# Patient Record
Sex: MALE | Race: WHITE
[De-identification: names, ages, dates, MRNs, and addresses within clinical notes are randomized per-mention and may not be internally consistent; named-entity substitution may affect disease eponyms.]

---

## 2017-08-25 ENCOUNTER — HOSPITAL ENCOUNTER (EMERGENCY)
Dept: HOSPITAL 83 - ED | Age: 32
Discharge: HOME | End: 2017-08-25
Payer: SELF-PAY

## 2017-08-25 VITALS — HEIGHT: 68.98 IN | BODY MASS INDEX: 30.36 KG/M2 | WEIGHT: 205 LBS

## 2017-08-25 DIAGNOSIS — T63.441A: Primary | ICD-10-CM
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
## 2017-08-25 DIAGNOSIS — M79.89: ICD-10-CM

## 2017-08-25 DIAGNOSIS — F17.200: ICD-10-CM

## 2018-09-11 ENCOUNTER — HOSPITAL ENCOUNTER (EMERGENCY)
Dept: HOSPITAL 83 - ED | Age: 33
Discharge: HOME | End: 2018-09-11
Payer: SELF-PAY

## 2018-09-11 VITALS — HEIGHT: 70 IN | BODY MASS INDEX: 28.63 KG/M2 | WEIGHT: 200 LBS

## 2018-09-11 DIAGNOSIS — L73.2: Primary | ICD-10-CM

## 2018-09-11 DIAGNOSIS — F17.200: ICD-10-CM

## 2018-09-11 DIAGNOSIS — Z79.899: ICD-10-CM

## 2019-07-05 ENCOUNTER — HOSPITAL ENCOUNTER (INPATIENT)
Dept: HOSPITAL 83 - ED | Age: 34
LOS: 5 days | Discharge: HOME | DRG: 854 | End: 2019-07-10
Attending: INTERNAL MEDICINE | Admitting: INTERNAL MEDICINE
Payer: SELF-PAY

## 2019-07-05 VITALS
BODY MASS INDEX: 28.96 KG/M2 | HEIGHT: 70 IN | DIASTOLIC BLOOD PRESSURE: 78 MMHG | WEIGHT: 202.31 LBS | SYSTOLIC BLOOD PRESSURE: 120 MMHG

## 2019-07-05 VITALS — SYSTOLIC BLOOD PRESSURE: 120 MMHG | DIASTOLIC BLOOD PRESSURE: 59 MMHG

## 2019-07-05 DIAGNOSIS — Z83.3: ICD-10-CM

## 2019-07-05 DIAGNOSIS — E78.5: ICD-10-CM

## 2019-07-05 DIAGNOSIS — F17.210: ICD-10-CM

## 2019-07-05 DIAGNOSIS — Z79.899: ICD-10-CM

## 2019-07-05 DIAGNOSIS — Z80.9: ICD-10-CM

## 2019-07-05 DIAGNOSIS — R73.9: ICD-10-CM

## 2019-07-05 DIAGNOSIS — K21.9: ICD-10-CM

## 2019-07-05 DIAGNOSIS — L03.116: ICD-10-CM

## 2019-07-05 DIAGNOSIS — Z71.6: ICD-10-CM

## 2019-07-05 DIAGNOSIS — L02.612: ICD-10-CM

## 2019-07-05 DIAGNOSIS — A41.9: Primary | ICD-10-CM

## 2019-07-05 DIAGNOSIS — E66.3: ICD-10-CM

## 2019-07-05 LAB
BASOPHILS # BLD AUTO: 0.1 10*3/UL (ref 0–0.1)
BASOPHILS NFR BLD AUTO: 0.4 % (ref 0–1)
BUN SERPL-MCNC: 17 MG/DL (ref 7–24)
CHLORIDE SERPL-SCNC: 107 MMOL/L (ref 98–107)
CREAT SERPL-MCNC: 1.1 MG/DL (ref 0.7–1.3)
EOSINOPHIL # BLD AUTO: 0.3 10*3/UL (ref 0–0.4)
EOSINOPHIL # BLD AUTO: 1.8 % (ref 1–4)
ERYTHROCYTE [DISTWIDTH] IN BLOOD BY AUTOMATED COUNT: 12.6 % (ref 0–14.5)
HCT VFR BLD AUTO: 46 % (ref 42–52)
HGB BLD-MCNC: 16 G/DL (ref 14–18)
LYMPHOCYTES # BLD AUTO: 3.5 10*3/UL (ref 1.3–4.4)
LYMPHOCYTES NFR BLD AUTO: 23.5 % (ref 27–41)
MCH RBC QN AUTO: 31.9 PG (ref 27–31)
MCHC RBC AUTO-ENTMCNC: 34.8 G/DL (ref 33–37)
MCV RBC AUTO: 91.6 FL (ref 80–94)
MONOCYTES # BLD AUTO: 1 10*3/UL (ref 0.1–1)
MONOCYTES NFR BLD MANUAL: 7 % (ref 3–9)
NEUT #: 9.8 10*3/UL (ref 2.3–7.9)
NEUT %: 67 % (ref 47–73)
NRBC BLD QL AUTO: 0 % (ref 0–0)
PLATELET # BLD AUTO: 355 10*3/UL (ref 130–400)
PMV BLD AUTO: 10 FL (ref 9.6–12.3)
POTASSIUM SERPL-SCNC: 4 MMOL/L (ref 3.5–5.1)
RBC # BLD AUTO: 5.02 10*6/UL (ref 4.5–5.9)
SODIUM SERPL-SCNC: 141 MMOL/L (ref 136–145)
WBC NRBC COR # BLD AUTO: 14.7 10*3/UL (ref 4.8–10.8)

## 2019-07-05 NOTE — PR
[place], [place]
 
                                      PROGRESS NOTE
 
        NAME: ALICIA AMAYA                 [facility] #: [id number]  
        UNIT #: [id number]                       ROOM: 507       
        DOCTOR: ADAM FERNANDES DPM               BIRTHDATE: [birth year]
 
 
DOS: 07/08/2019
 
SUBJECTIVE:  This patient is seen for followup of abscess, dorsal left forefoot.
 The patient denies any trauma or injury.  He denies being diabetic.  The
patient denies fever or chills.  Denies nausea, vomiting or night sweats.  He
states he is eating well and admits to some mild pain in the foot.
 
OBJECTIVE:   Pedal pulses are palpable.  Hair growth is present.  Skin
temperature is warm.  CFT is less than 2 seconds to all digits.  Sensation is
grossly intact and symmetrical.  Dorsal left forefoot just proximal to the
fourth webspace has some localized edema and erythema with some tenderness noted
to palpation.  No expressible drainage.  Still some mild increase in temperature
noted.  Erythema is about 2-3 cm surrounding the central area at the proximal
webspace, left foot.
 
LABORATORY DATA:  White blood cell count is up to 11.6, patient is afebrile.
 
ASSESSMENT:  Cutaneous abscess, left foot, infection, left foot pain.
 
PLAN:  Evaluation and management.  The patient just had his MRI recently and is
not read yet.  I discussed with him treatment will depend on what the MRI shows.
 If MRI is positive for abscess, he may need surgical incision and drainage of
the area.  If MRI is negative for abscess, then possibly discharge home on
antibiotics, told him his treatment.  Next step in treatment will depend on the
results of the MRI. We will see what the MRI shows and follow up from there. 
The patient is given opportunity to ask questions and all questions were
answered.
 
 
 
_________________________________
ADAM FERNANDES DPM
 
CM:PNTRANS
 
D: 07/08/19 1220                 
T: 07/08/19 2328
             
                                                            
ADAM FERNANDES DPM               
                                                            
07/08/19
2327
                              
interface

## 2019-07-05 NOTE — CON
[place], [place]
 
                                 REPORT OF CONSULTATION
 
        NAME: ALICIA AMAYA                   formerly [facility] #: [id number]  
        UNIT #: [id number]                         ROOM: 507       
        DOCTOR: CHRISTEL RIVAS DPM              BIRTHDATE: [birth year]
 
 
DOS: 07/06/2019
 
SUBJECTIVE:  The patient presents a 34-year-old male with chief complaint of
swelling and infection of the left foot, the patient has had for the past week. 
The patient has been seen at the ED at [facility], was sent home with p.o.
antibiotics.  The patient was seen yesterday at the Emergency Room and was
admitted.
 
PAST MEDICAL HISTORY:  Bee-sting, hidradenitis, infected dental caries,
pulpitis.
 
PAST SURGICAL HISTORY:  None.
 
SOCIAL HISTORY:  Cigarette nicotine dependence.  Denies illicit drug use or
alcohol.
 
ALLERGIES:  None.
 
LOWER EXTREMITY EXAMINATION:  Pedal pulses palpable.  Epicritic sensations
intact.  There is erythema with mild edema to the dorsal distal fourth left
interspace MPJ.  There is a small fluctuant abscess noted overlying the dorsal
fourth left interspace incision and drainage with a 15 blade revealed
approximately 1cc mL of purulent drainage.  There is no apparent deep sinus
tract.  Erythema apparently has decreased since previous exam, was noted to
encompass the dorsal distal lateral left foot.  The results of the CT scan of
the left foot revealed soft tissue swelling dorsal lateral aspect.  No osseous
findings.  No signs of gas within the tissue.  The patient's WBC has decreased
to 11.0 today compared to 14.7 yesterday.  The patient's lactic acid baseline
was 1.1 yesterday.
 
ASSESSMENT:  Abscess, cellulitis, pain with edema dorsolateral, left foot.
 
PLAN:  Evaluation and management.  Sterile prep with Betadine was performed, a 1
cm incision was made over the dorsal fourth left MPJ interspace area,
approximately 1cc mL of purulent drainage was noted and was cultured.  No
apparent deep sinus tract was noticed at this time.  The area was cleaned with
saline and betadine gauze, Kerlix dressing was applied.  Discussed with the
patient.  His WBC already decreased to 11 that I believe the abscess was
resolved with the bedside procedure, but if the erythema and edema along with
pain does not continue to decrease tomorrow that we may need to take him for
further exploration in the operating room on Monday if warranted.  I do not feel
this will be necessary, but if deemed no further improvement by tomorrow, we may
take the patient to the OR for a larger incision under anesthesia and deeper
exploration of the soft tissue.  Recommend Infectious Disease consultation and
we will discuss the case with Dr. Garcia and Dr. Jade.  Dr. Garcia
will see the patient tomorrow.
 
 
 
 
 
                              [place], [place]
 
                                 REPORT OF CONSULTATION
 
        NAME: ALICIA AMAYA HUA                   ACCT #: [id number]  
        UNIT #: [id number]                         ROOM: 50       
        DOCTOR: CHRISTEL RIVAS DPM              BIRTHDATE: [birth year]
 
 
_________________________________
CHRISTEL RIVAS DPM
 
CM:CONSTR:REPORT OF CONSULTATION
 
D: 07/06/19 0826   T: 07/06/19                           
07/10/19     0726                                          interface

## 2019-07-05 NOTE — O
[place], [place]
 
                                      OPERATIVE NOTE
 
        NAME: ALICIA AMYAA                  [facility] #: [id number]  
        UNIT #: [id number]                        ROOM: 507       
        DOCTOR: CHRISTEL RIVAS DPM             BIRTHDATE: [birth year]
 
 
DOS: 07/09/2019
 
PREOPERATIVE DIAGNOSIS:  Abscess, left foot.
 
POSTOPERATIVE DIAGNOSIS:  Abscess, left foot.
 
SURGERY:  Incision and drainage of deep abscess, left foot.
 
BLOOD LOSS:  4 mL.
 
PACKING:  Quarter inch plain packing.
 
SURGEON:  Christel Rivas DPM.
 
ASSISTANT:  AUNDREA Garcia DPM.
 
ANESTHESIA:  General.
 
COMPLICATIONS:  None.
 
PROCEDURE DETAILS:  The patient was seen in preop area and the left foot was
reevaluated.  The abscess had progressed to worsening situation since his
previous evaluation on Saturday.  There is still edema and erythema noted with
pain overlying the fourth left interspace extending into the interspace between
the fourth and fifth left toes.  Discussed preoperatively with the patient and
his family the surgery in details, postoperative recovery time, and potential
risks and complications.  The patient agreed and we proceeded with surgical
intervention consisting of incision and drainage of the left foot.  The patient
brought to the operating room, placed on the operating table in supine position.
 Anesthesia administered per anesthesia department.  Next, a 15 blade was
utilized to incise the abscess overlying the dorsal fourth intermetatarsal space
overlying the MPJ area.  Purulent drainage was noted to emanate from the wound,
which was much worse than his previous procedure performed Saturday.  A [place]
elevator was utilized and the abscess extended to the dorsal fourth left MPJ
extending into the fourth interspace down to the plantar foot.  The abscess was
drained at this time.  There was approximately 6 mL of purulent drainage noted,
which was also cultured.  The area was inspected for further evidence of
tracking and none was found.  The area was copiously flushed with saline and the
proximal incision was reapproximated in a horizontal mattress stitch manner with
3-0 Vicryl.  Next, the remaining site was packed with quarter-inch plain packing
and sterile compressive dressing consisting of Xeroform, 4 x 4s, Kerlix and Ace
bandage was applied.  The patient resumed previous orders, returned to the
nursing unit and continued antibiotics per infectious disease.  The patient
tolerated the procedure and anesthesia well and left the OR with vital signs
stable and neurovascular status intact.
 
 
 
 
                              [place], [place]
 
                                      OPERATIVE NOTE
 
        NAME: ALICIA AMAYA                  ACCT #: [id number]  
        UNIT #: [id number]                        ROOM: 507       
        DOCTOR: CHRISTEL RIVAS DPM             BIRTHDATE: [birth year]
 
 
_________________________________
CHRISTEL RIVAS DPM
 
CM:OPRECORD:OPERATIVE NOTE
 
D: 07/09/19 1519
T: 07/09/19 1731
    
                                                            
CHRISTEL RIVAS DPM            
                                                            
07/09/19
1945
                              
interface

## 2019-07-06 VITALS — DIASTOLIC BLOOD PRESSURE: 68 MMHG | SYSTOLIC BLOOD PRESSURE: 114 MMHG

## 2019-07-06 VITALS — SYSTOLIC BLOOD PRESSURE: 116 MMHG | DIASTOLIC BLOOD PRESSURE: 67 MMHG

## 2019-07-06 VITALS — DIASTOLIC BLOOD PRESSURE: 59 MMHG

## 2019-07-06 VITALS — DIASTOLIC BLOOD PRESSURE: 75 MMHG | SYSTOLIC BLOOD PRESSURE: 116 MMHG

## 2019-07-06 LAB
ALBUMIN SERPL-MCNC: 3 GM/DL (ref 3.1–4.5)
ALP SERPL-CCNC: 98 U/L (ref 45–117)
ALT SERPL W P-5'-P-CCNC: 31 U/L (ref 12–78)
AST SERPL-CCNC: 13 IU/L (ref 3–35)
BASOPHILS # BLD AUTO: 0.1 10*3/UL (ref 0–0.1)
BASOPHILS NFR BLD AUTO: 0.5 % (ref 0–1)
BUN SERPL-MCNC: 13 MG/DL (ref 7–24)
CHLORIDE SERPL-SCNC: 111 MMOL/L (ref 98–107)
CHOLEST SERPL-MCNC: 142 MG/DL (ref ?–200)
CREAT SERPL-MCNC: 0.96 MG/DL (ref 0.7–1.3)
EOSINOPHIL # BLD AUTO: 0.3 10*3/UL (ref 0–0.4)
EOSINOPHIL # BLD AUTO: 3.1 % (ref 1–4)
ERYTHROCYTE [DISTWIDTH] IN BLOOD BY AUTOMATED COUNT: 12.6 % (ref 0–14.5)
HCT VFR BLD AUTO: 42.6 % (ref 42–52)
HDLC SERPL-MCNC: 29 MG/DL (ref 40–60)
HGB BLD-MCNC: 13.9 G/DL (ref 14–18)
INR BLD: 0.9 (ref 2–3.5)
LDLC SERPL DIRECT ASSAY-MCNC: 70 MG/DL (ref 9–159)
LYMPHOCYTES # BLD AUTO: 3.9 10*3/UL (ref 1.3–4.4)
LYMPHOCYTES NFR BLD AUTO: 35.7 % (ref 27–41)
MCH RBC QN AUTO: 30.5 PG (ref 27–31)
MCHC RBC AUTO-ENTMCNC: 32.6 G/DL (ref 33–37)
MCV RBC AUTO: 93.6 FL (ref 80–94)
MONOCYTES # BLD AUTO: 1 10*3/UL (ref 0.1–1)
MONOCYTES NFR BLD MANUAL: 9.2 % (ref 3–9)
NEUT #: 5.7 10*3/UL (ref 2.3–7.9)
NEUT %: 51.2 % (ref 47–73)
NRBC BLD QL AUTO: 0 10*3/UL (ref 0–0)
PHOSPHATE SERPL-MCNC: 3.2 MG/DL (ref 2.5–4.9)
PLATELET # BLD AUTO: 291 10*3/UL (ref 130–400)
PMV BLD AUTO: 9.9 FL (ref 9.6–12.3)
POTASSIUM SERPL-SCNC: 3.8 MMOL/L (ref 3.5–5.1)
PROT SERPL-MCNC: 5.9 GM/DL (ref 6.4–8.2)
RBC # BLD AUTO: 4.55 10*6/UL (ref 4.5–5.9)
SODIUM SERPL-SCNC: 142 MMOL/L (ref 136–145)
TRIGL SERPL-MCNC: 214 MG/DL (ref ?–150)
TSH SERPL DL<=0.005 MIU/L-ACNC: 2.95 UIU/ML (ref 0.36–4.75)
VLDLC SERPL CALC-MCNC: 43 MG/DL (ref 6–40)
WBC NRBC COR # BLD AUTO: 11 10*3/UL (ref 4.8–10.8)

## 2019-07-06 PROCEDURE — 0S9N0ZZ DRAINAGE OF LEFT METATARSAL-PHALANGEAL JOINT, OPEN APPROACH: ICD-10-PCS | Performed by: PODIATRIST

## 2019-07-07 VITALS — DIASTOLIC BLOOD PRESSURE: 66 MMHG | SYSTOLIC BLOOD PRESSURE: 124 MMHG

## 2019-07-07 VITALS — DIASTOLIC BLOOD PRESSURE: 76 MMHG

## 2019-07-07 VITALS — SYSTOLIC BLOOD PRESSURE: 136 MMHG | DIASTOLIC BLOOD PRESSURE: 86 MMHG

## 2019-07-07 VITALS — SYSTOLIC BLOOD PRESSURE: 113 MMHG | DIASTOLIC BLOOD PRESSURE: 78 MMHG

## 2019-07-07 VITALS — DIASTOLIC BLOOD PRESSURE: 70 MMHG

## 2019-07-07 VITALS — DIASTOLIC BLOOD PRESSURE: 78 MMHG | SYSTOLIC BLOOD PRESSURE: 130 MMHG

## 2019-07-07 LAB
BASOPHILS # BLD AUTO: 0 10*3/UL (ref 0–0.1)
BASOPHILS NFR BLD AUTO: 0.4 % (ref 0–1)
EOSINOPHIL # BLD AUTO: 0.3 10*3/UL (ref 0–0.4)
EOSINOPHIL # BLD AUTO: 2.3 % (ref 1–4)
ERYTHROCYTE [DISTWIDTH] IN BLOOD BY AUTOMATED COUNT: 12.4 % (ref 0–14.5)
HCT VFR BLD AUTO: 43.9 % (ref 42–52)
HGB BLD-MCNC: 14.5 G/DL (ref 14–18)
LYMPHOCYTES # BLD AUTO: 3 10*3/UL (ref 1.3–4.4)
LYMPHOCYTES NFR BLD AUTO: 28.2 % (ref 27–41)
MCH RBC QN AUTO: 30.5 PG (ref 27–31)
MCHC RBC AUTO-ENTMCNC: 33 G/DL (ref 33–37)
MCV RBC AUTO: 92.4 FL (ref 80–94)
MONOCYTES # BLD AUTO: 0.7 10*3/UL (ref 0.1–1)
MONOCYTES NFR BLD MANUAL: 6.9 % (ref 3–9)
NEUT #: 6.6 10*3/UL (ref 2.3–7.9)
NEUT %: 61.9 % (ref 47–73)
NRBC BLD QL AUTO: 0 10*3/UL (ref 0–0)
PLATELET # BLD AUTO: 305 10*3/UL (ref 130–400)
PMV BLD AUTO: 9.4 FL (ref 9.6–12.3)
RBC # BLD AUTO: 4.75 10*6/UL (ref 4.5–5.9)
WBC NRBC COR # BLD AUTO: 10.7 10*3/UL (ref 4.8–10.8)

## 2019-07-08 VITALS — DIASTOLIC BLOOD PRESSURE: 82 MMHG

## 2019-07-08 VITALS — DIASTOLIC BLOOD PRESSURE: 70 MMHG

## 2019-07-08 VITALS — DIASTOLIC BLOOD PRESSURE: 74 MMHG

## 2019-07-08 VITALS — DIASTOLIC BLOOD PRESSURE: 77 MMHG

## 2019-07-08 VITALS — DIASTOLIC BLOOD PRESSURE: 89 MMHG

## 2019-07-08 LAB
ALBUMIN SERPL-MCNC: 3.5 GM/DL (ref 3.1–4.5)
ALP SERPL-CCNC: 92 U/L (ref 45–117)
ALT SERPL W P-5'-P-CCNC: 34 U/L (ref 12–78)
AST SERPL-CCNC: 17 IU/L (ref 3–35)
BASOPHILS # BLD AUTO: 0.1 10*3/UL (ref 0–0.1)
BASOPHILS NFR BLD AUTO: 0.5 % (ref 0–1)
BUN SERPL-MCNC: 12 MG/DL (ref 7–24)
CHLORIDE SERPL-SCNC: 105 MMOL/L (ref 98–107)
CREAT SERPL-MCNC: 0.96 MG/DL (ref 0.7–1.3)
EOSINOPHIL # BLD AUTO: 0.2 10*3/UL (ref 0–0.4)
EOSINOPHIL # BLD AUTO: 1.9 % (ref 1–4)
ERYTHROCYTE [DISTWIDTH] IN BLOOD BY AUTOMATED COUNT: 12.2 % (ref 0–14.5)
HCT VFR BLD AUTO: 45.7 % (ref 42–52)
HGB BLD-MCNC: 15.3 G/DL (ref 14–18)
LYMPHOCYTES # BLD AUTO: 3.1 10*3/UL (ref 1.3–4.4)
LYMPHOCYTES NFR BLD AUTO: 26.5 % (ref 27–41)
MCH RBC QN AUTO: 30.8 PG (ref 27–31)
MCHC RBC AUTO-ENTMCNC: 33.5 G/DL (ref 33–37)
MCV RBC AUTO: 92.1 FL (ref 80–94)
MONOCYTES # BLD AUTO: 0.9 10*3/UL (ref 0.1–1)
MONOCYTES NFR BLD MANUAL: 7.4 % (ref 3–9)
NEUT #: 7.4 10*3/UL (ref 2.3–7.9)
NEUT %: 63.3 % (ref 47–73)
NRBC BLD QL AUTO: 0 % (ref 0–0)
PHOSPHATE SERPL-MCNC: 3 MG/DL (ref 2.5–4.9)
PLATELET # BLD AUTO: 303 10*3/UL (ref 130–400)
PMV BLD AUTO: 9.5 FL (ref 9.6–12.3)
POTASSIUM SERPL-SCNC: 4 MMOL/L (ref 3.5–5.1)
PROT SERPL-MCNC: 7 GM/DL (ref 6.4–8.2)
RBC # BLD AUTO: 4.96 10*6/UL (ref 4.5–5.9)
SODIUM SERPL-SCNC: 138 MMOL/L (ref 136–145)
WBC NRBC COR # BLD AUTO: 11.6 10*3/UL (ref 4.8–10.8)

## 2019-07-09 VITALS — DIASTOLIC BLOOD PRESSURE: 64 MMHG | SYSTOLIC BLOOD PRESSURE: 135 MMHG

## 2019-07-09 VITALS — SYSTOLIC BLOOD PRESSURE: 102 MMHG | DIASTOLIC BLOOD PRESSURE: 64 MMHG

## 2019-07-09 VITALS — DIASTOLIC BLOOD PRESSURE: 60 MMHG

## 2019-07-09 VITALS — DIASTOLIC BLOOD PRESSURE: 62 MMHG

## 2019-07-09 VITALS — SYSTOLIC BLOOD PRESSURE: 126 MMHG | DIASTOLIC BLOOD PRESSURE: 77 MMHG

## 2019-07-09 VITALS — DIASTOLIC BLOOD PRESSURE: 73 MMHG | SYSTOLIC BLOOD PRESSURE: 122 MMHG

## 2019-07-09 VITALS — DIASTOLIC BLOOD PRESSURE: 61 MMHG | SYSTOLIC BLOOD PRESSURE: 112 MMHG

## 2019-07-09 VITALS — DIASTOLIC BLOOD PRESSURE: 70 MMHG

## 2019-07-09 VITALS — SYSTOLIC BLOOD PRESSURE: 120 MMHG | DIASTOLIC BLOOD PRESSURE: 65 MMHG

## 2019-07-09 VITALS — DIASTOLIC BLOOD PRESSURE: 58 MMHG

## 2019-07-09 VITALS — DIASTOLIC BLOOD PRESSURE: 67 MMHG

## 2019-07-09 PROCEDURE — 0S9N0ZZ DRAINAGE OF LEFT METATARSAL-PHALANGEAL JOINT, OPEN APPROACH: ICD-10-PCS | Performed by: PODIATRIST

## 2019-07-10 VITALS — DIASTOLIC BLOOD PRESSURE: 71 MMHG | SYSTOLIC BLOOD PRESSURE: 116 MMHG

## 2019-07-10 VITALS — SYSTOLIC BLOOD PRESSURE: 120 MMHG | DIASTOLIC BLOOD PRESSURE: 60 MMHG

## 2019-07-10 VITALS — SYSTOLIC BLOOD PRESSURE: 141 MMHG | DIASTOLIC BLOOD PRESSURE: 79 MMHG

## 2019-07-10 LAB
ALBUMIN SERPL-MCNC: 3.8 GM/DL (ref 3.1–4.5)
ALP SERPL-CCNC: 108 U/L (ref 45–117)
ALT SERPL W P-5'-P-CCNC: 63 U/L (ref 12–78)
AST SERPL-CCNC: 110 IU/L (ref 3–35)
BASOPHILS # BLD AUTO: 0 10*3/UL (ref 0–0.1)
BASOPHILS NFR BLD AUTO: 0.1 % (ref 0–1)
BUN SERPL-MCNC: 14 MG/DL (ref 7–24)
CHLORIDE SERPL-SCNC: 106 MMOL/L (ref 98–107)
CREAT SERPL-MCNC: 0.9 MG/DL (ref 0.7–1.3)
EOSINOPHIL # BLD AUTO: 0 % (ref 1–4)
EOSINOPHIL # BLD AUTO: 0 10*3/UL (ref 0–0.4)
ERYTHROCYTE [DISTWIDTH] IN BLOOD BY AUTOMATED COUNT: 12 % (ref 0–14.5)
HCT VFR BLD AUTO: 47.4 % (ref 42–52)
HGB BLD-MCNC: 16 G/DL (ref 14–18)
LYMPHOCYTES # BLD AUTO: 2.3 10*3/UL (ref 1.3–4.4)
LYMPHOCYTES NFR BLD AUTO: 10.9 % (ref 27–41)
MCH RBC QN AUTO: 30.7 PG (ref 27–31)
MCHC RBC AUTO-ENTMCNC: 33.8 G/DL (ref 33–37)
MCV RBC AUTO: 90.8 FL (ref 80–94)
MONOCYTES # BLD AUTO: 1.1 10*3/UL (ref 0.1–1)
MONOCYTES NFR BLD MANUAL: 5.4 % (ref 3–9)
NEUT #: 17.5 10*3/UL (ref 2.3–7.9)
NEUT %: 83.2 % (ref 47–73)
NRBC BLD QL AUTO: 0 10*3/UL (ref 0–0)
PLATELET # BLD AUTO: 388 10*3/UL (ref 130–400)
PMV BLD AUTO: 9.4 FL (ref 9.6–12.3)
POTASSIUM SERPL-SCNC: 4.1 MMOL/L (ref 3.5–5.1)
PROT SERPL-MCNC: 7.8 GM/DL (ref 6.4–8.2)
RBC # BLD AUTO: 5.22 10*6/UL (ref 4.5–5.9)
SODIUM SERPL-SCNC: 138 MMOL/L (ref 136–145)
SPECIMEN PREPARATION: (no result)
WBC NRBC COR # BLD AUTO: 21.1 10*3/UL (ref 4.8–10.8)

## 2019-12-14 NOTE — NUR
in to talk to patient.
Patient states lives at HOME with WIFE.
There are 1 steps in the home.
Physician: NONE  WAS GIVEN LIST
Pharmacy: [facility]
[place] health services: NONE
Patient's level of ADLs: INDEPENDENT
Patient has working utilities: YES
DME: NONE
Follow-up physician's appointment after d/c: WILL PICK FROM LIST PROVIDED AND
MAKE APPOINTMENT
Does patient want to access PORTAL?: YES
Discharge plan LIVES AT HOME WITH HIS WIFE AND KIDS.  PT IS INDEPENDENT IN
CARE.  PLANS TO RETURN HOME ON DISCHARGE.  STATES HE WILL HAVE NO NEEDS.  WILL
CONTINUE TO FOLLOW.  STATES HE WILL HAVE A RIDE HOME ON DISCHARGE..
 
LONG,NISHA
20CC OF THE ZOSYN INFUSED PRIOR TO TAKING IT DOWN PER ORDER
24 HR chart check completed.
24 HR chart check completed.
A 34, admitted to , under the
services of ROBERT Redman DO with a diagnosis of ABSCESS.
Chief complaint is FOOT PAIN.
Patient arrived via wheel chair from ER.
Monitor applied. Initial assessment completed.
Vital signs taken and recorded.
ROBERT REDMAN DO notified of admission to the unit.
Orders received.
See assessment for past medical history, medications
and allergies.
Patient and/or family oriented [facility].
Clothing/patient valuable form completed.
 
HANH ALEXANDER
ADMINISTERED PRN NORCO AS ORDERED PER PATIENT REQUEST, SEE EMAR, FOR
PROPHALACTIC PAIN PRIOR TO DRESSING CHANGE. WILL CONTINUE TO MONITOR CALL
LIGHT IN REACH.  HEMSPN
ALICIA AMAYA [id number]
 
Please refer to the physician's history and physical for past medical history,
comorbid conditions, and allergies.
   Diagnosis: ABSCESS SEPSIS LEFT FOOT PAIN
 
   Leander Score: 23,LOW OR NO RISK
 
WOUND DESCRIPTIONS:
Spoke with Dr. Wilkerson regarding dressing intact to left foot.  No strike
through drainage noted. He stated podiatry is following patient and to keep
dressing intact. Patient stated he will follow up with podiatry once he is
discharged.
   Surface the patient is resting on: Isoflex
 
SKIN PREVENTION RECOMMENDATION:
   1.  Pressure redistribution support surface as appropriate
   2.  Elevate heels
   3.  Remove boots/TEDS every shift and reapply
   4.  Head of bed 30 degrees as tolerated
   5.  Assess nutrition and hydration
   6.  Manage moisture
   7.  Avoid the use of containment devices while in bed
   8.  Use absorptive products on surfaces limit layers of linens on bed
   9.  Turn and reposition every 1-2 hours in bed and every 1 hour in chair as
       tolerated
   10. Weight shifts every 15 minutes while up in chair
   11. Offloading with pillows or device to keep heels elevated off bed
   12. Monitor skin at least every shift
   13. Inspect under medical devices twice a day
 
WOUND TREATMENT RECOMMENDATIONS:
DR LOZANO AT BEDSIDE
DR MORROW AWARE OF CONSULT. NO ORDERS
DR PAGE ANSWERING SERVICE NOTIFIED OF CONSULT
DR. RIVAS LANCED WOUND FOR WOUND CULTURE. CULTURE SENT. WOUND COVERED AND
WRAPPED WITH CURLEX. PT C/O SEVERE PAIN AT RETRIEVAL OF CULTURE. REFUSED
MORPHINE , CHOSE TYLENOL FOR RELIEF. PATIENT IS RESTING COMFORTABLY AT THIS
TIME NO COMPLAINTS.
MEDICATED WITH BENEDRYL PER ORDER FOR ITCHINESS
MEDICATED WITH PRN MORPHINE SLOW PUSH FOR C/O FOOT PAIN RATED 10/10 ON A 0/10
PAIN SCALE. STATES IT MADE HIM LIGHT HEADED. ZOSYN UP AND INFUSING PER ORDER.
C/O ITCHINESS ALL OVER. DR LOZANO MADE AWARE
MEDICATED WITH PRN PERCOCET FOR PAIN RATED 8/10 ON A 0/10 PAIN SCALE. WILL
MONITOR
MEDICATED WITH PRN PO PERCOCET.
MEDICATED WITH PRN PO TYLENOL FOR LEFT FOOT PAIN.
PATIENT REFUSES APPLICATION OF CARDIAC MONITOR UPON RETURN FROM MRI PROCEDURE.
PATIENT REPORTS PAIN 7/10 IN L FOOT, PRN MEDICATION GIVEN. WILL REASSESS.
PATIENT TO MRI AT THIS TIME.
PATIENT TO OR BY BED.
PERCOCET EFFECTIVE PER PATIENT. SITTING UP IN BED EATING DINNER.
PERCOCET IS HELPING PER PT
WILL MONITOR
PRESCRIPTIONS SENT TO PHARMACY WITH INSTRUCTIONS TO PATIENT TO PICK THEM UP.
IV DISONTINUED. PT WHEELED VIA WHEELCHAIR OFF FLOOR TO PRIVATE CAR. DISCHARGE
INSTRUCTIONS AND FOLLOW UP CARE DISCUSSED.
PRN PAIN MEDICATION EFFECTIVE PER PATIENT.
PRN PAIN MEDICATIONS GIVEN D/T PAIN 7/10 IN FOOT, WILL MONITOR AND REASSESS.
PRN PO TYLENOL EFFECTIVE, PER PATIENT.
PT AWAKE IN BED W/FAMILY AT BEDSIDE DURING BEDSIDE SHIFT REPORT.  NO C/O
VOICED AT PRESENT TIME.
PT AWAKE IN BED W/FAMILY AT BEDSIDE DURING BEDSIDE SHIFT REPORT.  NO C/O
VOICED.  PT ENCOURAGED TO ELEVATE LLE.  PT COMPLIANT.  CALL LIGHT IN REACH.
PT CONTINUES TO DENY NEEDS ON DISCHARGE.  WILL CONTINUE TO FOLLOW.
PT MEDICATED W/MORPHINE FOR C/O LT HEEL PAIN 10/10.  ICE APPLIED TO HEEL.  PT
C/O MILD NAUSEA W/MORPHINE IVP BUT REFUSING ZOFRAN AT THIS TIME.  FAMILY AT
BEDSIDE.  CALL LIGHT IN REACH.
PT MEDICATED W/PERCOCET FOR C/O LEFT FOOT PAIN 5/10. CALL LIGHT IN REACH.
PT MEDICATED W/PERCOCET PER REQUEST FOR C/O LT HEEL PAIN.  LLE ELEVATED ON
PILLOW.  CALL LIGHT IN REACH.
PT REQUESTED AND GIVEN PERCOCET FOR C/O LE PAIN.  PT RATES PAIN 8/10
 
WILL MONITOR
PT REQUESTED AND GIVEN TYLENOL FOR C/O FOOT PAIN.  PT RATES PAIN 5/10
WILL MONITOR
PT RESTING IN BED.  NO DISTRESS NOTED.  WILL MONITOR
CALL LIGHT WITHIN REACH
PT STATES HE IS GOING HOME TODAY AFTER THEY TAKE PACKING OUT ONE WAY OR
ANOTHER.  WILL CONTINUE TO FOLLOW.
PT STATES THAT MORPHINE WAS EFFECTIVE TO TAKE THE MAJORITY OF THE PAIN AWAY IN
HIS FOOT.
RETURNED FROM SURGERY BY BED, FAMILY IS AT BEDSIDE, PATIENT IS ALERT AND
ORIENTED. DRESSING TO LEFT FOOT DRY AND INTACT, RESUMING DIET.
declines

## 2021-12-23 ENCOUNTER — HOSPITAL ENCOUNTER (OUTPATIENT)
Dept: HOSPITAL 83 - COVID19 | Age: 36
Discharge: HOME | End: 2021-12-23
Attending: STUDENT IN AN ORGANIZED HEALTH CARE EDUCATION/TRAINING PROGRAM
Payer: COMMERCIAL

## 2021-12-23 DIAGNOSIS — U07.1: Primary | ICD-10-CM

## 2023-09-26 NOTE — CON
[place], [place]
 
                                 REPORT OF CONSULTATION
 
        NAME: ALICIA AMAYA                   [id number]  
        UNIT #: [id number]                         ROOM: 507       
        DOCTOR: MERLINE BERNARD,APRIL                BIRTHDATE: [birth year]
 
 
DOS: 07/07/2019
 
HISTORY OF PRESENT ILLNESS:  The patient is a 34-year-old  male who was
admitted with left foot abscess and cellulitis.  He has had a bedside I and D by
Dr. Suarez with Podiatry.  Cultures from the foot are pending.  He was admitted
late on the 5th.  He was placed on Zosyn and vancomycin.  Gram stain from the
6th has no organisms.  The patient troubles originally began about 10 days ago. 
He developed some pain in the left foot.  After about 3-4 days, he developed
some erythema and swelling.  He was seen in the Emergency Room here.  He is not
sure what he was given at that point that was approximately 6 days ago.  Two
days later, the foot continued to worsen with swelling, pain and redness.  He
went to [facility] and was seen in the Emergency Room and was given Bactrim and
Keflex.  The foot did not improve and continued to worsen, so he came to the ER
on the 5th.  CAT scan showed swelling of the foot, but no foreign bodies or
osseous lesions.  He denies any fevers or chills at home.  He tolerated the
antibiotics.  He was given without issue.  He denies any prior trauma to the
foot.  He thinks he may have had some athlete's foot in the area that the
abscess developed.  He has had no prior skin abscesses or similar lesions.  He
has no history of diabetes and his hemoglobin A1c is 5.5.
 
PAST MEDICAL HISTORY:  He denies any significant past medical history.
 
SOCIAL HISTORY:  He is a smoker, approximately 1 pack per day.  He works
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  He drinks approximately once a
week.  Denies any illicit drug use.
 
FAMILY MEDICAL HISTORY:  Not pertinent to chief complaint.
 
ALLERGIES:  No known drug allergies.
 
CURRENT MEDICATIONS:  Include vancomycin, meropenem, Lovenox, Protonix,
Percocet, Restoril, Zofran, Milk of Mag.
 
REVIEW OF SYSTEMS:  As above in history of present illness.  Pain in the left
foot has improved since antibiotics and I and D.  Again, no fevers or chills
here or at home.  No nausea, vomiting, diarrhea.  No cough or shortness of
breath.  No rash or itch.  No headache or dizziness.  No chest pain or
palpitations.  Further review of systems is unremarkable.
 
LABORATORY DATA:  WBCs are down to 10.5 and they were 14.7 on admission.  BUN
13, creatinine 0.96.  Vancomycin trough 10.5.
 
PHYSICAL EXAMINATION:
VITAL SIGNS:  Temperature 97.8, pulse 68, respirations 20, /66.
GENERAL:  A 34-year-old  male, alert and oriented, in no acute
distress.
HEENT:  Normocephalic.  Pupils equal, round and reactive to light.  Oropharynx
is clear.  No thrush.
NECK:  Supple.
LUNGS:  Clear to auscultation bilaterally.  Respirations even and unlabored.
 
                              [place], [place]
 
                                 REPORT OF CONSULTATION
 
        NAME: ALICIA AMAYA                   ACCT #: [id number]  
        UNIT #: [id number]                         ROOM: 507       
        DOCTOR: MERLINE BERNARDAPRIL                BIRTHDATE: [birth year]
 
 
HEART:  Regular rhythm.  No murmur appreciated.
ABDOMEN:  Soft, nondistended, nontender.
EXTREMITIES:  No edema or deformity.  Left foot at the base of the fourth and
fifth toes with erythema.  Small wound with no active discharge, unable to
express any purulence.  Does have some tenderness, erythema and increased
warmth, no fluctuance.
SKIN:  Otherwise, warm, dry, free of rashes.
 
ASSESSMENT:  Left foot small abscess and cellulitis, status post bedside
drainage.
 
PLAN:  We will follow up on the cultures.  Continue empiric antibiotics with
vancomycin and Zosyn.  It is possible that he had area of athlete's foot that
provided access for bacterial entry.  I would suspect possibly a Staph and
causing the abscess.  Follow up on cultures and adjust antibiotics accordingly. 
Would anticipate being able to discharge with oral antibiotics.
 
ADDENDUM.
 
I agree with the assessment and plan made by the nurse practitioner.  I reviewed
the labs and imaging and made the necessary changes in the note.
 
 
 
 
_________________________________
APRIL JOANA RICK
 
 
_________________________________
April Medley MD
 
CM:CONSTR:REPORT OF CONSULTATION
 
D: 07/07/19 1548   T: 07/07/19 07/18/19     0522                                          interface 02-Oct-2023

## 2025-02-10 ENCOUNTER — HOSPITAL ENCOUNTER (EMERGENCY)
Dept: HOSPITAL 83 - ED | Age: 40
Discharge: HOME | End: 2025-02-10
Payer: COMMERCIAL

## 2025-02-10 VITALS — BODY MASS INDEX: 32.93 KG/M2 | WEIGHT: 230 LBS | HEIGHT: 70 IN
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
## 2025-02-10 DIAGNOSIS — S20.211A: Primary | ICD-10-CM
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
## 2025-02-10 DIAGNOSIS — Z88.1: ICD-10-CM
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
## 2025-02-10 DIAGNOSIS — F17.210: ICD-10-CM

## 2025-02-10 DIAGNOSIS — Z88.8: ICD-10-CM
